# Patient Record
Sex: FEMALE | Race: WHITE
[De-identification: names, ages, dates, MRNs, and addresses within clinical notes are randomized per-mention and may not be internally consistent; named-entity substitution may affect disease eponyms.]

---

## 2019-02-15 ENCOUNTER — HOSPITAL ENCOUNTER (INPATIENT)
Dept: HOSPITAL 26 - MED | Age: 69
Discharge: ANOTHER HOSPITAL | DRG: 871 | End: 2019-02-15
Attending: GENERAL PRACTICE | Admitting: GENERAL PRACTICE
Payer: COMMERCIAL

## 2019-02-15 VITALS — HEIGHT: 64 IN | WEIGHT: 190 LBS | BODY MASS INDEX: 32.44 KG/M2

## 2019-02-15 VITALS — SYSTOLIC BLOOD PRESSURE: 170 MMHG | DIASTOLIC BLOOD PRESSURE: 90 MMHG

## 2019-02-15 VITALS — SYSTOLIC BLOOD PRESSURE: 167 MMHG | DIASTOLIC BLOOD PRESSURE: 79 MMHG

## 2019-02-15 VITALS — SYSTOLIC BLOOD PRESSURE: 173 MMHG | DIASTOLIC BLOOD PRESSURE: 85 MMHG

## 2019-02-15 VITALS — SYSTOLIC BLOOD PRESSURE: 164 MMHG | DIASTOLIC BLOOD PRESSURE: 79 MMHG

## 2019-02-15 VITALS — SYSTOLIC BLOOD PRESSURE: 172 MMHG | DIASTOLIC BLOOD PRESSURE: 97 MMHG

## 2019-02-15 DIAGNOSIS — Z86.73: ICD-10-CM

## 2019-02-15 DIAGNOSIS — F32.9: ICD-10-CM

## 2019-02-15 DIAGNOSIS — N39.0: ICD-10-CM

## 2019-02-15 DIAGNOSIS — M48.04: ICD-10-CM

## 2019-02-15 DIAGNOSIS — F12.90: ICD-10-CM
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
## 2019-02-15 DIAGNOSIS — F41.1: ICD-10-CM

## 2019-02-15 DIAGNOSIS — G47.00: ICD-10-CM

## 2019-02-15 DIAGNOSIS — D50.9: ICD-10-CM
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
## 2019-02-15 DIAGNOSIS — E78.5: ICD-10-CM

## 2019-02-15 DIAGNOSIS — I25.10: ICD-10-CM

## 2019-02-15 DIAGNOSIS — E86.0: ICD-10-CM
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
## 2019-02-15 DIAGNOSIS — A41.9: Primary | ICD-10-CM
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
## 2019-02-15 DIAGNOSIS — I10: ICD-10-CM

## 2019-02-15 DIAGNOSIS — S22.049A: ICD-10-CM

## 2019-02-15 DIAGNOSIS — E87.1: ICD-10-CM

## 2019-02-15 DIAGNOSIS — Z79.899: ICD-10-CM

## 2019-02-15 DIAGNOSIS — E11.9: ICD-10-CM

## 2019-02-15 DIAGNOSIS — E03.9: ICD-10-CM

## 2019-02-15 DIAGNOSIS — N17.0: ICD-10-CM

## 2019-02-15 LAB
ALBUMIN FLD-MCNC: 3.7 G/DL (ref 3.4–5)
AMYLASE SERPL-CCNC: 75 U/L (ref 25–115)
ANION GAP SERPL CALCULATED.3IONS-SCNC: 10.7 MMOL/L (ref 8–16)
APPEARANCE UR: CLEAR
AST SERPL-CCNC: 35 U/L (ref 15–37)
BARBITURATES UR QL SCN: (no result) NG/ML
BASOPHILS # BLD AUTO: 0 K/UL (ref 0–0.22)
BASOPHILS NFR BLD AUTO: 0.2 % (ref 0–2)
BENZODIAZ UR QL SCN: (no result) NG/ML
BILIRUB SERPL-MCNC: 0.9 MG/DL (ref 0–1)
BILIRUB UR QL STRIP: (no result)
BUN SERPL-MCNC: 11 MG/DL (ref 7–18)
BZE UR QL SCN: (no result) NG/ML
CANNABINOIDS UR QL SCN: (no result) NG/ML
CHLORIDE SERPL-SCNC: 97 MMOL/L (ref 98–107)
CHOLEST/HDLC SERPL: 2.5 {RATIO} (ref 1–4.5)
CO2 SERPL-SCNC: 30.1 MMOL/L (ref 21–32)
COLOR UR: YELLOW
CREAT SERPL-MCNC: 0.9 MG/DL (ref 0.6–1.3)
EOSINOPHIL # BLD AUTO: 0 K/UL (ref 0–0.4)
EOSINOPHIL NFR BLD AUTO: 0.3 % (ref 0–4)
ERYTHROCYTE [DISTWIDTH] IN BLOOD BY AUTOMATED COUNT: 17.2 % (ref 11.6–13.7)
GFR SERPL CREATININE-BSD FRML MDRD: 80 ML/MIN (ref 90–?)
GLUCOSE SERPL-MCNC: 232 MG/DL (ref 74–106)
GLUCOSE UR STRIP-MCNC: NEGATIVE MG/DL
HCT VFR BLD AUTO: 37.5 % (ref 36–48)
HDLC SERPL-MCNC: 66 MG/DL (ref 40–60)
HGB BLD-MCNC: 11.6 G/DL (ref 12–16)
HGB UR QL STRIP: NEGATIVE
IRON SERPL-MCNC: 63 UG/DL (ref 35–150)
LDLC SERPL CALC-MCNC: 42 MG/DL (ref 60–100)
LEUKOCYTE ESTERASE UR QL STRIP: (no result)
LIPASE SERPL-CCNC: 278 U/L (ref 73–393)
LYMPHOCYTES # BLD AUTO: 0.7 K/UL (ref 2.5–16.5)
LYMPHOCYTES NFR BLD AUTO: 6.1 % (ref 20.5–51.1)
MAGNESIUM SERPL-MCNC: 2 MG/DL (ref 1.8–2.4)
MCH RBC QN AUTO: 24 PG (ref 27–31)
MCHC RBC AUTO-ENTMCNC: 31 G/DL (ref 33–37)
MCV RBC AUTO: 76.7 FL (ref 80–94)
MONOCYTES # BLD AUTO: 0.8 K/UL (ref 0.8–1)
MONOCYTES NFR BLD AUTO: 6.6 % (ref 1.7–9.3)
NEUTROPHILS # BLD AUTO: 10.6 K/UL (ref 1.8–7.7)
NEUTROPHILS NFR BLD AUTO: 86.8 % (ref 42.2–75.2)
NITRITE UR QL STRIP: NEGATIVE
OPIATES UR QL SCN: (no result) NG/ML
PCP UR QL SCN: (no result) NG/ML
PH UR STRIP: 6 [PH] (ref 5–9)
PHOSPHATE SERPL-MCNC: 2.6 MG/DL (ref 2.5–4.9)
PLATELET # BLD AUTO: 249 K/UL (ref 140–450)
POTASSIUM SERPL-SCNC: 3.8 MMOL/L (ref 3.5–5.1)
PROTHROMBIN TIME: 9.9 SECS (ref 10.8–13.4)
RBC # BLD AUTO: 4.9 MIL/UL (ref 4.2–5.4)
RBC #/AREA URNS HPF: (no result) /HPF (ref 0–5)
SODIUM SERPL-SCNC: 134 MMOL/L (ref 136–145)
T4 FREE SERPL-MCNC: 0.93 NG/DL (ref 0.76–1.46)
TIBC SERPL-MCNC: 411 UG/DL (ref 250–450)
TRIGL SERPL-MCNC: 273 MG/DL (ref 30–150)
TSH SERPL DL<=0.05 MIU/L-ACNC: 3.4 UIU/ML (ref 0.34–3.74)
WBC # BLD AUTO: 12.2 K/UL (ref 4.8–10.8)
WBC,URINE: (no result) /HPF (ref 0–5)

## 2019-02-15 PROCEDURE — G0482 DRUG TEST DEF 15-21 CLASSES: HCPCS

## 2019-02-15 RX ADMIN — Medication SCH DEV: at 17:00

## 2019-02-15 RX ADMIN — HYDROMORPHONE HYDROCHLORIDE PRN MG: 1 INJECTION, SOLUTION INTRAMUSCULAR; INTRAVENOUS; SUBCUTANEOUS at 15:54

## 2019-02-15 RX ADMIN — Medication SCH DEV: at 11:57

## 2019-02-15 RX ADMIN — HYDROMORPHONE HYDROCHLORIDE PRN MG: 1 INJECTION, SOLUTION INTRAMUSCULAR; INTRAVENOUS; SUBCUTANEOUS at 20:06

## 2019-02-15 NOTE — NUR
AT 1735 HRS: CRISTINA FROM TRANSFER CENTER/ADMISSION INTAKE OF [facility] 
CALLED AND ACCEPTED PT., ACCEPTING PHYSICIAN KIA DELVALLE. PT IS GOING TO ROOM 5211 
MED/TELE FLOOR. HOSPITAL ADDR[facility]: [facility] [place] [address]. TO CALL 
FOR REPORT: [phone]. DR. WEILE AND NORRIS-RN NOTIFIED. PT AND  MADE AWARE AND 
STATED THEY BOTH AGREED WITH THE TRANSFER. SPOKE WITH NESS FROM [facility], ETA FOR ACLS  
WILL BE IN AN HOUR. [facility] PCS FORM FAXED, CONFIRMATION PLACED IN THE PT'S CHART. [facility] 
[facility] SUP NOTIFIED.

## 2019-02-15 NOTE — NUR
CALL FROM [facility] AND DAUGHTER STATING PATIENT HAS NOT ARRIVED TO ROOM. 
CALLED [facility] SPOKE WITH JESENIA, HE STATED PATIENT ARRIVED AT 2126. CALLED BACK FROM DAUGHTER 
STATING THEY FOUND PATIENT IN ER. CHARGE NURSE XAVIER LYONS.

## 2019-02-15 NOTE — NUR
GAVE REPORT TO MICHAEL MUELLER FROM [facility]. ALL QUESTIONS ANSWERED AT 
THIS TIME. MICHAEL VERBALIZED UNDERSTANDING OF REPORT.

## 2019-02-15 NOTE — NUR
RECEIVED BEDSIDE REPORT FROM JOB PISANO, PATIENT IN BED, ON RA, IV IN LEFT AC 22 G SL, FAMILY 
AT BEDSIDE. DAY SHIFT JOB PISANO STATED CHARGE NURSE DAVID SAID OKAY TO GIVE DILAUDID PRIOR 
TO TRANSFER. NORRIS STATED SHE WILL FINISH TRANSFER PAPERWORK AND HAVE PATIENT SIGN. V/S 
TAKEN NOTED /90 HR 76 RR 16. JOB PISANO STATED DOCTOR IS AWARE OF HIGH BP.

## 2019-02-15 NOTE — NUR
HELPED PT DRESS FOR TRANSFER TO [facility]. PT TOLERATED WELL. WILL 
CONTINUE TO MONITOR.

## 2019-02-15 NOTE — NUR
PT PAIN IS 10/10 AFTER 3MG MORPHINE IVP. DR. MCKINNEY ORDERED ANOTHER 3MG MORPHINE IVP FOR 
PAIN. WILL CONTINUE TO MONITOR.

## 2019-02-15 NOTE — NUR
PT BIB  DUE TO UPPER BACK PAIN S/P FALL YESTERDAY. 10/10 SHARP NON 
RADIATING PAIN MID UPPER BACK THAT PER PATIENT " HURTS WHEN I BREATH". DENIES 
ANY N/V/D. DENIES ANY LOC , DENIES DIZZYNESS. PT STATES " YESTERDAY NIGHT I WAS 
SLEEPING AND I ROLLED OVER TO THE SIDE AND FELL FROM MY BED, I DIDNT HIT MY 
HEAD OR ANYTHING BUT MY BACK HURTS A LOT". SENSATION INTACT IN LOWER AND UPPER 
EXTREMITIES. DENIES ANY LOSS OF BOWEL CONTROL.  AT BEDSIDE. WILL 
CONTINUE TO MONITOR. ER MD MADE AWARE. SAFETY PRECAUTIONS IN PLACE.

## 2019-02-15 NOTE — NUR
CHARGE NURSE DAVID STATED OKAY TO GIVE DILAUDID PRIOR TO TRANSFER. GAVE DILAUDID ACCORDING 
TO MD ORDER. WRIST BANDS REMOVED, ALL D/C PAPER SIGNED. JOB PISANO STATED CHARGE NURSE DAVID 
STATED OKAY TO LEAVE IV IN. PATIENT LEFT VIA GURNEY WITH [facility], PATIENT STABLE

## 2019-02-15 NOTE — NUR
Spoke to  at [phone] who goes to [facility] and accepted patient, Dr. Sanchez is the primary care physician of the patient. will call the hospital to request a bed 
at [phone]. 

Called [facility] and left a message to the supervisor to call back.

## 2019-02-15 NOTE — NUR
Patient will be admitted to care of [facility]. Admited to TELE.  Will go to room 
110B. Belongings list completed.  Report to JOB PISANO .

## 2019-02-15 NOTE — NUR
PT. RESTING COMFORTABLY IN BED, HOB ELEVATED AND REPOSITIONED FOR COMFORT. PT 
STATES " I FEEL BETTER NOT THAT MUCH PAIN".  AT BEDSIDE. WILL CONTINUE 
TO MONITOR. VSS.

## 2019-02-15 NOTE — NUR
RECEIVED REPORT FROM EMERGENCY ROOM NURSE. PT IN STABLE CONDITION. IV INTACT. SAFETY 
MEASURES IN PLACE. BED IN LOW POSITION. WILL CONTINUE TO MONITOR.

## 2019-02-16 LAB
FERRITIN SERPL-MCNC: 65 NG/ML (ref 15–150)
FOLATE SERPL-MCNC: > 20 NG/ML (ref 3–?)
VIT B12 SERPL-MCNC: 455 PG/ML (ref 232–1245)

## 2019-02-27 LAB — TRANSFERRIN SERPL-MCNC: 374 MG/DL (ref 200–370)
